# Patient Record
Sex: MALE | Race: WHITE
[De-identification: names, ages, dates, MRNs, and addresses within clinical notes are randomized per-mention and may not be internally consistent; named-entity substitution may affect disease eponyms.]

---

## 2018-03-20 ENCOUNTER — HOSPITAL ENCOUNTER (EMERGENCY)
Dept: HOSPITAL 45 - C.EDB | Age: 23
Discharge: HOME | End: 2018-03-20
Payer: COMMERCIAL

## 2018-03-20 VITALS
WEIGHT: 178.13 LBS | HEIGHT: 70 IN | BODY MASS INDEX: 25.5 KG/M2 | WEIGHT: 178.13 LBS | BODY MASS INDEX: 25.5 KG/M2 | HEIGHT: 70 IN

## 2018-03-20 VITALS — OXYGEN SATURATION: 95 % | HEART RATE: 106 BPM | DIASTOLIC BLOOD PRESSURE: 70 MMHG | SYSTOLIC BLOOD PRESSURE: 143 MMHG

## 2018-03-20 VITALS — TEMPERATURE: 100.94 F

## 2018-03-20 DIAGNOSIS — E11.9: ICD-10-CM

## 2018-03-20 DIAGNOSIS — J02.9: Primary | ICD-10-CM

## 2018-03-20 DIAGNOSIS — Z79.4: ICD-10-CM

## 2018-03-20 NOTE — EMERGENCY ROOM VISIT NOTE
ED Visit Note


First contact with patient:  11:21


CHIEF COMPLAINT: Sore throat, headache, fever, body aches, chills





HISTORY OF PRESENT ILLNESS: This 22-year-old male patient presents to the 

emergency department ambulatory, complaining of sore throat 2 days.  The 

patient has taken no medications for his symptoms.  The patient received 

reports associated body aches, chills, head pressure, anterior cervical 

lymphadenopathy.  They deny any other symptoms including congestion, rhinorrhea

, cough, nausea, or vomiting.  There is pain with swallowing and the patient is 

having difficulty eating.  The patient does not recall any known exposure to 

strep throat, but is a [employer] student.  Denies a rash.  The patient is 

diabetic, and states his blood sugars have been very elevated for the past 2 

days.  He is tolerating food and fluids without any vomiting.  He states he has 

had a decreased appetite.





REVIEW OF SYSTEMS: A 10 system review of systems was performed with positives 

and pertinent negatives listed in the history of present illness. All other 

systems were reviewed and are negative. 





ALLERGIES: None





MEDICATIONS: None





PMH: Diabetes





SOCIAL HISTORY: The patient is a [employer] student.  He lives locally with his 

roommates.  He denies drug, alcohol, tobacco use.





PHYSICAL EXAM: 


VITALS: Vitals are noted on the nurse's note and reviewed by myself.  Vital 

signs stable.


GENERAL: This is a 22-year-old white male, in no acute distress, nondiaphoretic

, well-developed well-nourished.


SKIN: The skin was without rashes, erythema, edema, or bruising.  There is no 

tenting of the skin.  Capillary reflex less than 2 seconds.


HEAD: Normocephalic atraumatic.  


EARS: External auditory canals clear, tympanic membranes pearly gray without 

erythema or effusion bilaterally.


EYES: Pupils equal round and reactive to light and accommodation.  Conjunctivae 

without injection, sclerae without icterus.  Extraocular movements intact.  


NOSE: Patent, turbinates without inflammation or discharge.  No sinus 

tenderness.


MOUTH: Mucous membranes moist.  Tonsils are not enlarged.  Pharynx does show 

erythema and exudate.  Uvula midline.  Airway patent.  Tongue does not deviate.

  


NECK: Supple without nuchal rigidity.  Positive anterior cervical 

lymphadenopathy noted.  No thyromegaly.  Cervical spine is nontender.  No JVD.


HEART: Regular rate and rhythm without murmurs gallops or rubs.


LUNGS: Clear to auscultation bilaterally without wheezes, rales or rhonchi.  No 

dullness to percussion.  No retractions or accessory muscle use.


MUSCULOSKELETAL: No muscle atrophy, erythema, or edema noted.  Full range of 

motion without joint tenderness in all extremities.  No tenderness to 

palpation.  Normal gait.  Strength 5/5 throughout.


NEURO: Patient was alert and oriented to person place and time.  Normal 

sensation to light and sharp touch.  No focal neurological deficits.





EMERGENCY DEPARTMENT COURSE: The patient was seen and evaluated as above.  

Rapid strep test was performed and was negative.  The patient scored 4 points 

on the Centor score with a 51-53% probability of strep pharyngitis.  In the 

setting of diabetes as well as this finding, the patient will be treated 

despite negative rapid strep test.  The patient was agreeable after discussion 

regarding risks versus benefits associated with antibiotic treatment.  The 

patient was given his first dose of penicillin as well as ibuprofen while here 

in the emergency department prior to discharge.  Discharge instructions reviewed

, and the patient was discharged home in good condition.





I attest that I have personally reviewed the patient's current medication list. 


Patient was found to have normal blood pressure on screening and does not 

require follow-up. 





DIFFERENTIAL DIAGNOSIS:  URI, acute sinusitis, influenza, viral pharyngitis, 

Strep Pharyngitis, Hand-Foot-Mouth Disease, Peritonsillar abscess, tonsilitis, 

malignancy, and others





DIAGNOSIS: Acute pharyngitis


Problem List


Medical Problems:


(1) Diabetes


Status: Chronic  











Current/Historical Medications


Scheduled


Insulin Human Lispro (Insulin Humalog Pump ), 1 EA N/A UD


Penicillin V Potassium (Veetids), 500 MG PO TID





Scheduled PRN


Albuterol Hfa (Ventolin Hfa), 2-4 PUFFS INH Q6H PRN for SOB/Wheezing


Glucagon (Glucagon Emergency Kit), 1 MG IM UD PRN for Severe Hypoglycemia


Meclizine Hcl (Meclizine Hcl), 25 MG PO TID PRN for Dizziness or Vertigo





Allergies


Coded Allergies:  


     No Known Allergies (Unverified , 3/20/18)





Vital Signs











  Date Time  Temp Pulse Resp B/P (MAP) Pulse Ox O2 Delivery O2 Flow Rate FiO2


 


3/20/18 12:53  106 17 143/70 95   


 


3/20/18 11:18 38.3 112 18 133/72 96 Room Air  











Medications Administered











 Medications


  (Trade)  Dose


 Ordered  Sig/Charlene


 Route  Start Time


 Stop Time Status Last Admin


Dose Admin


 


 Ibuprofen


  (Motrin Tab)  800 mg  NOW  STAT


 PO  3/20/18 12:03


 3/20/18 12:06 DC 3/20/18 12:26


800 MG


 


 Penicillin V


 Potassium


  (Veetids Tab)  500 mg  NOW  STAT


 PO  3/20/18 12:03


 3/20/18 12:06 DC 3/20/18 12:26


500 MG











Departure Information


Impression





 Primary Impression:  


 Acute pharyngitis





Dispostion


Home / Self-Care





Condition


GOOD





Prescriptions





Penicillin V Potassium (Veetids) 500 Mg Tab


500 MG PO TID for 10 Days, #30 TAB


   Prov: Esther Mckeon PA-C         3/20/18





Referrals


No Doctor, Assigned (PCP)








[facility]





Patient Instructions


ED Strep Pharyngitis Shira, My [facility]





Additional Instructions





You were seen in the ED today for sore throat. Your rapid strep screen was read 

as negative, however, based on your symptoms and diabetes history, I will be 

starting you on antibiotics. 





Take prescription medications as prescribed. Pen  mg t.i.d. x10 days. All 

antibiotics have the potential to cause diarrhea. A few develop a rash while on 

this medication stop the medication and see your family physician for 

evaluation. Take the Medrol Dosepak as prescribed.





Ibuprofen(Motrin, Advil) may be used for fever or pain.  Use 600mg every six 

hours as needed.  Take with food.  Avoid using more than 2400mg in a 24 hour 

period.  Do not use 2400mg per day for more than three consecutive days without 

physician direction.  Prolonged inappropriate use can lead to stomach upset or 

ulcers. 


(AND/OR)


Acetaminophen(Tylenol) may be used for fever or pain.  Use 1000mg every six 

hours as needed.  Avoid using more than 3000mg in a 24 hour period.  





Monitor your blood sugars and use your sliding scale for insulin dosing.





Use warm salt water gargles. And drink warm tea to help soothe your throat.





For your sore throat, you may use a 1:1 mixture of liquid Benadryl and liquid 

Maalox. Gargle and spit this mixture. It will help to soothe the throat and 

provide some relief.





Return to emergency department if symptoms of difficulty breathing, increased 

pain or difficulty swallowing, or symptoms do not respond to the case described.





Problem Qualifiers








 Primary Impression:  


 Acute pharyngitis


 Pharyngitis/tonsillitis etiology:  unspecified etiology  Qualified Codes:  

J02.9 - Acute pharyngitis, unspecified